# Patient Record
Sex: MALE | Race: WHITE | NOT HISPANIC OR LATINO | Employment: OTHER | URBAN - METROPOLITAN AREA
[De-identification: names, ages, dates, MRNs, and addresses within clinical notes are randomized per-mention and may not be internally consistent; named-entity substitution may affect disease eponyms.]

---

## 2023-03-20 ENCOUNTER — TELEPHONE (OUTPATIENT)
Dept: PRIMARY CARE | Facility: CLINIC | Age: 78
End: 2023-03-20
Payer: MEDICARE

## 2023-03-20 DIAGNOSIS — M25.50 ARTHRALGIA, UNSPECIFIED JOINT: ICD-10-CM

## 2023-03-20 DIAGNOSIS — M06.9 RHEUMATOID ARTHRITIS, INVOLVING UNSPECIFIED SITE, UNSPECIFIED WHETHER RHEUMATOID FACTOR PRESENT (MULTI): ICD-10-CM

## 2023-03-21 PROBLEM — M25.50 JOINT PAIN: Status: ACTIVE | Noted: 2023-03-21

## 2023-03-21 PROBLEM — M06.9 RHEUMATOID ARTHRITIS (MULTI): Status: ACTIVE | Noted: 2023-03-21

## 2023-04-21 ENCOUNTER — TELEPHONE (OUTPATIENT)
Dept: PRIMARY CARE | Facility: CLINIC | Age: 78
End: 2023-04-21

## 2023-08-01 ENCOUNTER — LAB (OUTPATIENT)
Dept: LAB | Facility: LAB | Age: 78
End: 2023-08-01
Payer: MEDICARE

## 2023-08-01 ENCOUNTER — OFFICE VISIT (OUTPATIENT)
Dept: PRIMARY CARE | Facility: CLINIC | Age: 78
End: 2023-08-01
Payer: MEDICARE

## 2023-08-01 VITALS
HEART RATE: 52 BPM | TEMPERATURE: 97.1 F | SYSTOLIC BLOOD PRESSURE: 128 MMHG | OXYGEN SATURATION: 99 % | WEIGHT: 179 LBS | HEIGHT: 68 IN | BODY MASS INDEX: 27.13 KG/M2 | DIASTOLIC BLOOD PRESSURE: 62 MMHG

## 2023-08-01 DIAGNOSIS — M06.9 RHEUMATOID ARTHRITIS, INVOLVING UNSPECIFIED SITE, UNSPECIFIED WHETHER RHEUMATOID FACTOR PRESENT (MULTI): ICD-10-CM

## 2023-08-01 DIAGNOSIS — E78.5 HYPERLIPIDEMIA, UNSPECIFIED HYPERLIPIDEMIA TYPE: ICD-10-CM

## 2023-08-01 DIAGNOSIS — K21.9 GASTROESOPHAGEAL REFLUX DISEASE WITHOUT ESOPHAGITIS: ICD-10-CM

## 2023-08-01 DIAGNOSIS — C20 RECTAL CANCER (MULTI): ICD-10-CM

## 2023-08-01 DIAGNOSIS — Z00.00 WELLNESS EXAMINATION: Primary | ICD-10-CM

## 2023-08-01 DIAGNOSIS — D64.9 ANEMIA, UNSPECIFIED TYPE: ICD-10-CM

## 2023-08-01 DIAGNOSIS — N40.0 BENIGN PROSTATIC HYPERPLASIA WITHOUT LOWER URINARY TRACT SYMPTOMS: ICD-10-CM

## 2023-08-01 DIAGNOSIS — M10.9 GOUT, UNSPECIFIED CAUSE, UNSPECIFIED CHRONICITY, UNSPECIFIED SITE: ICD-10-CM

## 2023-08-01 DIAGNOSIS — R97.20 ELEVATED PROSTATE SPECIFIC ANTIGEN LESS THAN 10 NG/ML: ICD-10-CM

## 2023-08-01 LAB
CARCINOEMBRYONIC AG (NG/ML) IN SER/PLAS: 1.1 UG/L
ERYTHROCYTE DISTRIBUTION WIDTH (RATIO) BY AUTOMATED COUNT: 13.9 % (ref 11.5–14.5)
ERYTHROCYTE MEAN CORPUSCULAR HEMOGLOBIN CONCENTRATION (G/DL) BY AUTOMATED: 30.7 G/DL (ref 32–36)
ERYTHROCYTE MEAN CORPUSCULAR VOLUME (FL) BY AUTOMATED COUNT: 92 FL (ref 80–100)
ERYTHROCYTES (10*6/UL) IN BLOOD BY AUTOMATED COUNT: 5.1 X10E12/L (ref 4.5–5.9)
FERRITIN (UG/LL) IN SER/PLAS: 69 UG/L (ref 20–300)
HEMATOCRIT (%) IN BLOOD BY AUTOMATED COUNT: 46.9 % (ref 41–52)
HEMOGLOBIN (G/DL) IN BLOOD: 14.4 G/DL (ref 13.5–17.5)
IRON (UG/DL) IN SER/PLAS: 81 UG/DL (ref 35–150)
IRON BINDING CAPACITY (UG/DL) IN SER/PLAS: 362 UG/DL (ref 240–445)
IRON SATURATION (%) IN SER/PLAS: 22 % (ref 25–45)
LEUKOCYTES (10*3/UL) IN BLOOD BY AUTOMATED COUNT: 6.8 X10E9/L (ref 4.4–11.3)
NRBC (PER 100 WBCS) BY AUTOMATED COUNT: 0 /100 WBC (ref 0–0)
PLATELETS (10*3/UL) IN BLOOD AUTOMATED COUNT: 175 X10E9/L (ref 150–450)
PROSTATE SPECIFIC ANTIGEN,SCREEN: 1.67 NG/ML (ref 0–4)
URATE (MG/DL) IN SER/PLAS: 6.9 MG/DL (ref 4–7.5)

## 2023-08-01 PROCEDURE — 1160F RVW MEDS BY RX/DR IN RCRD: CPT | Performed by: INTERNAL MEDICINE

## 2023-08-01 PROCEDURE — 82728 ASSAY OF FERRITIN: CPT

## 2023-08-01 PROCEDURE — 99214 OFFICE O/P EST MOD 30 MIN: CPT | Performed by: INTERNAL MEDICINE

## 2023-08-01 PROCEDURE — 83550 IRON BINDING TEST: CPT

## 2023-08-01 PROCEDURE — 36415 COLL VENOUS BLD VENIPUNCTURE: CPT

## 2023-08-01 PROCEDURE — 83540 ASSAY OF IRON: CPT

## 2023-08-01 PROCEDURE — 84550 ASSAY OF BLOOD/URIC ACID: CPT

## 2023-08-01 PROCEDURE — 1036F TOBACCO NON-USER: CPT | Performed by: INTERNAL MEDICINE

## 2023-08-01 PROCEDURE — 85027 COMPLETE CBC AUTOMATED: CPT

## 2023-08-01 PROCEDURE — 82378 CARCINOEMBRYONIC ANTIGEN: CPT

## 2023-08-01 PROCEDURE — G0439 PPPS, SUBSEQ VISIT: HCPCS | Performed by: INTERNAL MEDICINE

## 2023-08-01 PROCEDURE — 1159F MED LIST DOCD IN RCRD: CPT | Performed by: INTERNAL MEDICINE

## 2023-08-01 PROCEDURE — 84153 ASSAY OF PSA TOTAL: CPT

## 2023-08-01 RX ORDER — CHOLECALCIFEROL (VITAMIN D3) 25 MCG
1 TABLET ORAL DAILY
COMMUNITY
Start: 2015-06-15

## 2023-08-01 RX ORDER — OMEPRAZOLE 40 MG/1
1 CAPSULE, DELAYED RELEASE ORAL DAILY
COMMUNITY
Start: 2022-08-08 | End: 2023-12-08 | Stop reason: SDUPTHER

## 2023-08-01 RX ORDER — SIMVASTATIN 40 MG/1
1 TABLET, FILM COATED ORAL NIGHTLY
COMMUNITY
Start: 2014-10-02 | End: 2023-12-08 | Stop reason: SDUPTHER

## 2023-08-01 RX ORDER — FINASTERIDE 5 MG/1
TABLET, FILM COATED ORAL
COMMUNITY
Start: 2014-01-24 | End: 2023-12-08 | Stop reason: SDUPTHER

## 2023-08-01 RX ORDER — CELECOXIB 200 MG/1
200 CAPSULE ORAL 2 TIMES DAILY
COMMUNITY
Start: 2023-06-20 | End: 2024-01-16 | Stop reason: SDUPTHER

## 2023-08-01 NOTE — PROGRESS NOTES
Subjective   Patient ID: Ruy Ryan is a 77 y.o. male who presents for the following    MEDICARE WELLNESS 8/1/23 AND THE FOLLOWING     Assessment/Plan    Mild Anemia:  stable,      Asymptomatic Murmur: echo done last year. No major changes seen.      RA: he has elevated esr crp markers and + anti-dsDNA. RA is negative. he has localized pain as the base of the left thumb. failed hydroxychloroquine. He is following with Dr Pillai, rheumatology.      rectal cancer hx originally diagnosed in 2015: stable, follow up with GI every 5 years , check CEA      bph: stable, continue psa screen with dr solomon. patient taking finasteride.      gout flairs happen once a year. will continue to monitor him for now. he prefers no chronic treatment with allopurinol at this time.      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 He saw a rheumatologist and 2 orthopedic surgeons since last visit. He is currently going through hand therapy (doing essentially effleurage) and he states that his symptoms are improving significantly with therapy. He has no major complaints at this time.      Previous Hx:      RHEUMATOID ARTHRITIS: he now tells me that he had RA in the past and saw dr thompson . he does have ESR and CRP elevations in february 2022. he has a history of bleeding last november 2021 and he has been taking omeprazole ever since. he did not respond to Plaquenil at this time as his left fingers are swollen . he has not been taking Plaquenil for months.  Patient is tolerating Celebrex      rectal cancer: Oncology has signed off and he no longer sees Dr Carney. Last colonoscopy was last year.      bph: patient following with dr solomon. patient taking  "finasteride.      HLD: Patient denies any muscle aches and is tolerating statin therapy     GERD (Hx of GIB) : Patient denies any acid reflux symptoms including epigastric burning, nausea or vomiting. Denies any dysphagia.     family history: dad passed brain aneurysm, mom passed s/p heart surgery     colonoscopy is dr li 11/18/2021 with 5 year follow u    Visit Vitals  /62 (BP Location: Right arm, Patient Position: Sitting)   Pulse 52   Temp 36.2 °C (97.1 °F)   Ht 1.727 m (5' 8\")   Wt 81.2 kg (179 lb)   SpO2 99%   BMI 27.22 kg/m²   Smoking Status Never   BSA 1.97 m²     PHYSICAL EXAM     General appearance: Alert and in no acute distress. speech is clear and coherent  HEENT: Sclera and conjunctiva white, EOMI,     Respiratory : No respiratory distress, normal respiratory rhythm and effort. Clear bilateral breath sounds. No wheezing or rhonchi.   Cardiovascular: heart rate regular, S1, S2. no murmurs. no Lower extremity edema  Skin inspection: Normal skin color and pigmentation, normal skin turgor and no visible rash, induration, or cellulitis  MSK: 5/5 strength upper and lower extremities without gait abnormalities. no loss of muscle mass   Neuro: 2-12 CN grossly intact.  no slurred speech. no lateralizing deficit  Psychiatric Orientation: Oriented to person, place, and time. no depression, homicidal or suicidal thoughts, normal affect     REVIEW OF SYSTEMS   Constitutional: not feeling tired and no fever, chills or sweats. Denies weight loss    HEENT: no earache and no sore throat.   no headache  Cardiovascular: no exertional chest pain, no palpitations,    Lungs: Denies shortness of breath, exertional dyspnea   Gastrointestinal: no change in bowel habits, no diarrhea, no nausea, no vomiting and no abdominal pain. Denies Melena, brbpr or dark stool  Musculoskeletal: no myalgias, no muscle weakness    Skin: no rashes, no change in skin color   Neurological: no headaches, no seizures, no numbness, no " lateralizing deficits and no fainting.   Psychiatric: no depression and no anxiety.   Urine: denies polyuria, hematuria, dysuria      No Known Allergies    Current Outpatient Medications   Medication Sig Dispense Refill    celecoxib (CeleBREX) 200 mg capsule Take 1 capsule (200 mg) by mouth 2 times a day.      cholecalciferol (Vitamin D-3) 25 MCG (1000 UT) tablet Take 1 tablet (25 mcg) by mouth once daily.      finasteride (Proscar) 5 mg tablet Take by mouth.      omeprazole (PriLOSEC) 40 mg DR capsule Take 1 capsule (40 mg) by mouth once daily.      simvastatin (Zocor) 40 mg tablet Take 1 tablet (40 mg) by mouth once daily at bedtime.       No current facility-administered medications for this visit.       Objective     No visits with results within 4 Month(s) from this visit.   Latest known visit with results is:   Legacy Encounter on 02/01/2023   Component Date Value Ref Range Status    Ferritin 02/01/2023 53  20 - 300 ug/L Final    Cholesterol 02/01/2023 177  0 - 199 mg/dL Final    HDL 02/01/2023 53.0  mg/dL Final    Cholesterol/HDL Ratio 02/01/2023 3.3   Final    LDL 02/01/2023 95  0 - 99 mg/dL Final    VLDL 02/01/2023 29  0 - 40 mg/dL Final    Triglycerides 02/01/2023 147  0 - 149 mg/dL Final    WBC 02/01/2023 6.7  4.4 - 11.3 x10E9/L Final    nRBC 02/01/2023 0.0  0.0 - 0.0 /100 WBC Final    RBC 02/01/2023 5.09  4.50 - 5.90 x10E12/L Final    Hemoglobin 02/01/2023 14.1  13.5 - 17.5 g/dL Final    Hematocrit 02/01/2023 44.5  41.0 - 52.0 % Final    MCV 02/01/2023 87  80 - 100 fL Final    MCHC 02/01/2023 31.7 (L)  32.0 - 36.0 g/dL Final    Platelets 02/01/2023 179  150 - 450 x10E9/L Final    RDW 02/01/2023 14.1  11.5 - 14.5 % Final    Iron 02/01/2023 62  35 - 150 ug/dL Final    TIBC 02/01/2023 403  240 - 445 ug/dL Final    Iron Saturation 02/01/2023 15 (L)  25 - 45 % Final    Vitamin D, 25-Hydroxy 02/01/2023 49  ng/mL Final    Glucose 02/01/2023 89  74 - 99 mg/dL Final    Sodium 02/01/2023 140  136 - 145 mmol/L  Final    Potassium 02/01/2023 4.4  3.5 - 5.3 mmol/L Final    Chloride 02/01/2023 105  98 - 107 mmol/L Final    Bicarbonate 02/01/2023 26  21 - 32 mmol/L Final    Anion Gap 02/01/2023 13  10 - 20 mmol/L Final    Urea Nitrogen 02/01/2023 18  6 - 23 mg/dL Final    Creatinine 02/01/2023 1.11  0.50 - 1.30 mg/dL Final    GFR MALE 02/01/2023 67  >90 mL/min/1.73m2 Final    Calcium 02/01/2023 9.7  8.6 - 10.6 mg/dL Final    Albumin 02/01/2023 4.2  3.4 - 5.0 g/dL Final    Alkaline Phosphatase 02/01/2023 87  33 - 136 U/L Final    Total Protein 02/01/2023 7.2  6.4 - 8.2 g/dL Final    AST 02/01/2023 22  9 - 39 U/L Final    Total Bilirubin 02/01/2023 0.5  0.0 - 1.2 mg/dL Final    ALT (SGPT) 02/01/2023 14  10 - 52 U/L Final    TSH 02/01/2023 1.81  0.44 - 3.98 mIU/L Final       Radiology: Reviewed imaging in powerchart.  No results found.    No family history on file.  Social History     Socioeconomic History    Marital status:      Spouse name: None    Number of children: None    Years of education: None    Highest education level: None   Occupational History    None   Tobacco Use    Smoking status: Never    Smokeless tobacco: Never   Substance and Sexual Activity    Alcohol use: Never    Drug use: Never    Sexual activity: None   Other Topics Concern    None   Social History Narrative    None     Social Determinants of Health     Financial Resource Strain: Not on file   Food Insecurity: Not on file   Transportation Needs: Not on file   Physical Activity: Not on file   Stress: Not on file   Social Connections: Not on file   Intimate Partner Violence: Not on file   Housing Stability: Not on file     Past Medical History:   Diagnosis Date    Other specified diseases of anus and rectum 04/04/2016    Rectal mass     Past Surgical History:   Procedure Laterality Date    OTHER SURGICAL HISTORY  03/16/2015    Lipectomy    OTHER SURGICAL HISTORY  10/16/2015    Laparo Partial Colectomy W/ Anastomosis And Coloproctostomy    SHOULDER  SURGERY  03/16/2015    Shoulder Surgery       Charting was completed using voice recognition technology and may include unintended errors.

## 2023-12-08 DIAGNOSIS — E78.5 HYPERLIPIDEMIA, UNSPECIFIED HYPERLIPIDEMIA TYPE: Primary | ICD-10-CM

## 2023-12-08 DIAGNOSIS — N40.0 BENIGN PROSTATIC HYPERPLASIA WITHOUT LOWER URINARY TRACT SYMPTOMS: ICD-10-CM

## 2023-12-08 DIAGNOSIS — K21.9 GASTROESOPHAGEAL REFLUX DISEASE WITHOUT ESOPHAGITIS: ICD-10-CM

## 2023-12-08 RX ORDER — OMEPRAZOLE 40 MG/1
40 CAPSULE, DELAYED RELEASE ORAL DAILY
Qty: 90 CAPSULE | Refills: 3 | Status: SHIPPED | OUTPATIENT
Start: 2023-12-08

## 2023-12-08 RX ORDER — FINASTERIDE 5 MG/1
5 TABLET, FILM COATED ORAL DAILY
Qty: 90 TABLET | Refills: 3 | Status: SHIPPED | OUTPATIENT
Start: 2023-12-08

## 2023-12-08 RX ORDER — SIMVASTATIN 40 MG/1
40 TABLET, FILM COATED ORAL NIGHTLY
Qty: 90 TABLET | Refills: 3 | Status: SHIPPED | OUTPATIENT
Start: 2023-12-08

## 2024-01-01 PROCEDURE — 99291 CRITICAL CARE FIRST HOUR: CPT | Performed by: EMERGENCY MEDICINE

## 2024-01-16 DIAGNOSIS — Z00.00 ROUTINE GENERAL MEDICAL EXAMINATION AT A HEALTH CARE FACILITY: ICD-10-CM

## 2024-01-16 RX ORDER — CELECOXIB 200 MG/1
200 CAPSULE ORAL 2 TIMES DAILY
Qty: 180 CAPSULE | Refills: 3 | Status: SHIPPED | OUTPATIENT
Start: 2024-01-16

## 2024-01-29 ENCOUNTER — LAB (OUTPATIENT)
Dept: LAB | Facility: LAB | Age: 79
End: 2024-01-29
Payer: MEDICARE

## 2024-01-29 ENCOUNTER — OFFICE VISIT (OUTPATIENT)
Dept: PRIMARY CARE | Facility: CLINIC | Age: 79
End: 2024-01-29
Payer: MEDICARE

## 2024-01-29 VITALS — SYSTOLIC BLOOD PRESSURE: 138 MMHG | DIASTOLIC BLOOD PRESSURE: 78 MMHG

## 2024-01-29 DIAGNOSIS — E78.5 HYPERLIPIDEMIA, UNSPECIFIED HYPERLIPIDEMIA TYPE: ICD-10-CM

## 2024-01-29 DIAGNOSIS — M06.9 RHEUMATOID ARTHRITIS, INVOLVING UNSPECIFIED SITE, UNSPECIFIED WHETHER RHEUMATOID FACTOR PRESENT (MULTI): ICD-10-CM

## 2024-01-29 DIAGNOSIS — C20 RECTAL CANCER (MULTI): ICD-10-CM

## 2024-01-29 DIAGNOSIS — M10.9 GOUT, UNSPECIFIED CAUSE, UNSPECIFIED CHRONICITY, UNSPECIFIED SITE: ICD-10-CM

## 2024-01-29 DIAGNOSIS — D64.9 ANEMIA, UNSPECIFIED TYPE: ICD-10-CM

## 2024-01-29 DIAGNOSIS — E78.5 HYPERLIPIDEMIA, UNSPECIFIED HYPERLIPIDEMIA TYPE: Primary | ICD-10-CM

## 2024-01-29 LAB
ALBUMIN SERPL BCP-MCNC: 4.1 G/DL (ref 3.4–5)
ALP SERPL-CCNC: 87 U/L (ref 33–136)
ALT SERPL W P-5'-P-CCNC: 10 U/L (ref 10–52)
ANION GAP SERPL CALC-SCNC: 14 MMOL/L (ref 10–20)
AST SERPL W P-5'-P-CCNC: 16 U/L (ref 9–39)
BILIRUB SERPL-MCNC: 0.6 MG/DL (ref 0–1.2)
BUN SERPL-MCNC: 19 MG/DL (ref 6–23)
CALCIUM SERPL-MCNC: 10 MG/DL (ref 8.6–10.6)
CEA SERPL-MCNC: 0.9 UG/L
CHLORIDE SERPL-SCNC: 103 MMOL/L (ref 98–107)
CHOLEST SERPL-MCNC: 141 MG/DL (ref 0–199)
CHOLESTEROL/HDL RATIO: 2.9
CO2 SERPL-SCNC: 30 MMOL/L (ref 21–32)
CREAT SERPL-MCNC: 1.1 MG/DL (ref 0.5–1.3)
EGFRCR SERPLBLD CKD-EPI 2021: 68 ML/MIN/1.73M*2
ERYTHROCYTE [DISTWIDTH] IN BLOOD BY AUTOMATED COUNT: 14.8 % (ref 11.5–14.5)
FERRITIN SERPL-MCNC: 161 NG/ML (ref 20–300)
GLUCOSE SERPL-MCNC: 95 MG/DL (ref 74–99)
HCT VFR BLD AUTO: 41.6 % (ref 41–52)
HDLC SERPL-MCNC: 48.6 MG/DL
HGB BLD-MCNC: 12.6 G/DL (ref 13.5–17.5)
IRON SATN MFR SERPL: 9 % (ref 25–45)
IRON SERPL-MCNC: 30 UG/DL (ref 35–150)
LDLC SERPL CALC-MCNC: 69 MG/DL
MCH RBC QN AUTO: 26.3 PG (ref 26–34)
MCHC RBC AUTO-ENTMCNC: 30.3 G/DL (ref 32–36)
MCV RBC AUTO: 87 FL (ref 80–100)
NON HDL CHOLESTEROL: 92 MG/DL (ref 0–149)
NRBC BLD-RTO: 0 /100 WBCS (ref 0–0)
PLATELET # BLD AUTO: 221 X10*3/UL (ref 150–450)
POTASSIUM SERPL-SCNC: 4.7 MMOL/L (ref 3.5–5.3)
PROT SERPL-MCNC: 7.3 G/DL (ref 6.4–8.2)
RBC # BLD AUTO: 4.79 X10*6/UL (ref 4.5–5.9)
SODIUM SERPL-SCNC: 142 MMOL/L (ref 136–145)
TIBC SERPL-MCNC: 327 UG/DL (ref 240–445)
TRIGL SERPL-MCNC: 116 MG/DL (ref 0–149)
TSH SERPL-ACNC: 2.08 MIU/L (ref 0.44–3.98)
UIBC SERPL-MCNC: 297 UG/DL (ref 110–370)
URATE SERPL-MCNC: 7.1 MG/DL (ref 4–7.5)
VLDL: 23 MG/DL (ref 0–40)
WBC # BLD AUTO: 8.4 X10*3/UL (ref 4.4–11.3)

## 2024-01-29 PROCEDURE — 83550 IRON BINDING TEST: CPT

## 2024-01-29 PROCEDURE — 80061 LIPID PANEL: CPT

## 2024-01-29 PROCEDURE — 36415 COLL VENOUS BLD VENIPUNCTURE: CPT

## 2024-01-29 PROCEDURE — 84550 ASSAY OF BLOOD/URIC ACID: CPT

## 2024-01-29 PROCEDURE — 83540 ASSAY OF IRON: CPT

## 2024-01-29 PROCEDURE — 1036F TOBACCO NON-USER: CPT | Performed by: INTERNAL MEDICINE

## 2024-01-29 PROCEDURE — 1159F MED LIST DOCD IN RCRD: CPT | Performed by: INTERNAL MEDICINE

## 2024-01-29 PROCEDURE — 82728 ASSAY OF FERRITIN: CPT

## 2024-01-29 PROCEDURE — 82378 CARCINOEMBRYONIC ANTIGEN: CPT

## 2024-01-29 PROCEDURE — 85027 COMPLETE CBC AUTOMATED: CPT

## 2024-01-29 PROCEDURE — 80053 COMPREHEN METABOLIC PANEL: CPT

## 2024-01-29 PROCEDURE — 84443 ASSAY THYROID STIM HORMONE: CPT

## 2024-01-29 PROCEDURE — 99214 OFFICE O/P EST MOD 30 MIN: CPT | Performed by: INTERNAL MEDICINE

## 2024-01-29 NOTE — PROGRESS NOTES
Subjective   Patient ID: Ruy Ryan is a 78 y.o. male who presents for the following  CHRONIC DISEASE FOLLOW UP  MEDICARE WELLNESS 8/1/23    Assessment/Plan   Atypical chest pain: stress echo    GERD: stable on omeprazole.      Mild Anemia:  stable,      Asymptomatic Murmur: echo done last year. No major changes seen.      RA: he has elevated esr crp markers and + anti-dsDNA. RA is negative. he has localized pain as the base of the left thumb. failed hydroxychloroquine. He is following with Dr Pillai, rheumatology.      rectal cancer hx originally diagnosed in 2015: stable, follow up with GI every 5 years , check CEA      bph: stable, continue psa screen with dr solomon. patient taking finasteride.      gout flairs happen once a year. will continue to monitor him for now. he prefers no chronic treatment with allopurinol at this time.      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 He saw a rheumatologist and 2 orthopedic surgeons since last visit. He is currently going through hand therapy (doing essentially effleurage) and he states that his symptoms are improving significantly with therapy. He has no major complaints at this time.      Chest pain: patient says that he gets on and off chest pain. He says that it occurred with shoveling snow a few weeks ago. Central chest pain that is none radiating.      RHEUMATOID ARTHRITIS: he now tells me that he had RA in the past and saw dr thompson . he does have ESR and CRP elevations in february 2022. he has a history of bleeding last november 2021 and he has been taking omeprazole ever since. he did not respond to Plaquenil at this time as his left fingers are swollen . he has not been  taking Plaquenil for months.  Patient is tolerating Celebrex      rectal cancer: Oncology has signed off and he no longer sees Dr Carney. Last colonoscopy was last year.      bph: patient following with dr solomon. patient taking finasteride.      HLD: Patient denies any muscle aches and is tolerating statin therapy     GERD (Hx of GIB) : Patient denies any acid reflux symptoms including epigastric burning, nausea or vomiting. Denies any dysphagia.     family history: dad passed brain aneurysm, mom passed s/p heart surgery     colonoscopy is dr li 11/18/2021 with 5 year follow u    Visit Vitals  /78 (BP Location: Right arm, Patient Position: Sitting)   Smoking Status Never     PHYSICAL EXAM     General appearance: Alert and in no acute distress. speech is clear and coherent  HEENT: Sclera and conjunctiva white, EOMI,     Respiratory : No respiratory distress, normal respiratory rhythm and effort. Clear bilateral breath sounds. No wheezing or rhonchi.   Cardiovascular: heart rate regular, S1, S2. no murmurs. no Lower extremity edema  Skin inspection: Normal skin color and pigmentation, normal skin turgor and no visible rash, induration, or cellulitis  MSK: 5/5 strength upper and lower extremities without gait abnormalities. no loss of muscle mass   Neuro: 2-12 CN grossly intact.  no slurred speech. no lateralizing deficit  Psychiatric Orientation: Oriented to person, place, and time. no depression, homicidal or suicidal thoughts, normal affect     REVIEW OF SYSTEMS   Constitutional: not feeling tired and no fever, chills or sweats. Denies weight loss    HEENT: no earache and no sore throat.   no headache  Cardiovascular: no exertional chest pain, no palpitations,    Lungs: Denies shortness of breath, exertional dyspnea   Gastrointestinal: no change in bowel habits, no diarrhea, no nausea, no vomiting and no abdominal pain. Denies Melena, brbpr or dark stool  Musculoskeletal: no myalgias, no muscle weakness     Skin: no rashes, no change in skin color   Neurological: no headaches, no seizures, no numbness, no lateralizing deficits and no fainting.   Psychiatric: no depression and no anxiety.   Urine: denies polyuria, hematuria, dysuria      No Known Allergies    Current Outpatient Medications   Medication Sig Dispense Refill   • celecoxib (CeleBREX) 200 mg capsule Take 1 capsule (200 mg) by mouth 2 times a day. 180 capsule 3   • cholecalciferol (Vitamin D-3) 25 MCG (1000 UT) tablet Take 1 tablet (25 mcg) by mouth once daily.     • finasteride (Proscar) 5 mg tablet Take 1 tablet (5 mg) by mouth once daily. 90 tablet 3   • omeprazole (PriLOSEC) 40 mg DR capsule Take 1 capsule (40 mg) by mouth once daily. 90 capsule 3   • simvastatin (Zocor) 40 mg tablet Take 1 tablet (40 mg) by mouth once daily at bedtime. 90 tablet 3     No current facility-administered medications for this visit.       Objective     No visits with results within 4 Month(s) from this visit.   Latest known visit with results is:   Lab on 08/01/2023   Component Date Value Ref Range Status   • CARCINOEMBRYONIC AG (NG/ML) IN SER* 08/01/2023 1.1  ug/L Final   • WBC 08/01/2023 6.8  4.4 - 11.3 x10E9/L Final   • nRBC 08/01/2023 0.0  0.0 - 0.0 /100 WBC Final   • RBC 08/01/2023 5.10  4.50 - 5.90 x10E12/L Final   • Hemoglobin 08/01/2023 14.4  13.5 - 17.5 g/dL Final   • Hematocrit 08/01/2023 46.9  41.0 - 52.0 % Final   • MCV 08/01/2023 92  80 - 100 fL Final   • MCHC 08/01/2023 30.7 (L)  32.0 - 36.0 g/dL Final   • Platelets 08/01/2023 175  150 - 450 x10E9/L Final   • RDW 08/01/2023 13.9  11.5 - 14.5 % Final   • Uric Acid 08/01/2023 6.9  4.0 - 7.5 mg/dL Final   • Ferritin 08/01/2023 69  20 - 300 ug/L Final   • Iron 08/01/2023 81  35 - 150 ug/dL Final   • TIBC 08/01/2023 362  240 - 445 ug/dL Final   • Iron Saturation 08/01/2023 22 (L)  25 - 45 % Final   • Prostate Specific Antigen,Screen 08/01/2023 1.67  0.00 - 4.00 ng/mL Final       Radiology: Reviewed imaging in  powerchart.  No results found.    No family history on file.  Social History     Socioeconomic History   • Marital status:      Spouse name: None   • Number of children: None   • Years of education: None   • Highest education level: None   Occupational History   • None   Tobacco Use   • Smoking status: Never   • Smokeless tobacco: Never   Substance and Sexual Activity   • Alcohol use: Never   • Drug use: Never   • Sexual activity: None   Other Topics Concern   • None   Social History Narrative   • None     Social Determinants of Health     Financial Resource Strain: Not on file   Food Insecurity: Not on file   Transportation Needs: Not on file   Physical Activity: Not on file   Stress: Not on file   Social Connections: Not on file   Intimate Partner Violence: Not on file   Housing Stability: Not on file     Past Medical History:   Diagnosis Date   • Other specified diseases of anus and rectum 04/04/2016    Rectal mass     Past Surgical History:   Procedure Laterality Date   • OTHER SURGICAL HISTORY  03/16/2015    Lipectomy   • OTHER SURGICAL HISTORY  10/16/2015    Laparo Partial Colectomy W/ Anastomosis And Coloproctostomy   • SHOULDER SURGERY  03/16/2015    Shoulder Surgery       Charting was completed using voice recognition technology and may include unintended errors.

## 2024-02-06 ENCOUNTER — TELEPHONE (OUTPATIENT)
Dept: PRIMARY CARE | Facility: CLINIC | Age: 79
End: 2024-02-06

## 2024-02-06 NOTE — TELEPHONE ENCOUNTER
Patient was ordered a stress test by dr lundberg but its the treadmill one. He states he has a bad foot and is unable to do it. He was told to call the office and have dr order the nuclear stress test  Pleaser call patient once test is faxed so he can schedule

## 2024-03-04 NOTE — TELEPHONE ENCOUNTER
States no one ever called him regarding nuclear stress test. Pleas call pt and send order. Also would like to know details on this test.

## 2024-03-06 ENCOUNTER — TELEPHONE (OUTPATIENT)
Dept: PRIMARY CARE | Facility: CLINIC | Age: 79
End: 2024-03-06
Payer: MEDICARE

## 2024-03-06 NOTE — TELEPHONE ENCOUNTER
Pt called he does not want to do the stress test (which was already discussed), he also does not want to do the nuclear nelli scan.    Pt was wondering if he could get EKG done

## 2024-03-22 ENCOUNTER — TELEPHONE (OUTPATIENT)
Dept: PRIMARY CARE | Facility: CLINIC | Age: 79
End: 2024-03-22
Payer: MEDICARE

## 2024-03-22 NOTE — TELEPHONE ENCOUNTER
PT was discharged today from Hospital and told to have a follow up with Dr Salinas.  In one week.  Please provide a date and time so we can schedule.

## 2024-03-25 ENCOUNTER — APPOINTMENT (OUTPATIENT)
Dept: PRIMARY CARE | Facility: CLINIC | Age: 79
End: 2024-03-25
Payer: MEDICARE

## 2024-03-25 NOTE — TELEPHONE ENCOUNTER
TCM, discharged on 3/22/24 , patient did receive their discharge medications. Patient does not require immediate attention of the attending at this time and is scheduled for an appointment on 4/2/24.

## 2024-04-01 ENCOUNTER — LAB REQUISITION (OUTPATIENT)
Dept: LAB | Facility: HOSPITAL | Age: 79
End: 2024-04-01
Payer: MEDICARE

## 2024-04-02 ENCOUNTER — APPOINTMENT (OUTPATIENT)
Dept: PRIMARY CARE | Facility: CLINIC | Age: 79
End: 2024-04-02
Payer: MEDICARE

## 2024-04-02 ENCOUNTER — TELEPHONE (OUTPATIENT)
Dept: PRIMARY CARE | Facility: CLINIC | Age: 79
End: 2024-04-02

## 2024-04-02 ENCOUNTER — PATIENT OUTREACH (OUTPATIENT)
Dept: CARE COORDINATION | Facility: CLINIC | Age: 79
End: 2024-04-02

## 2024-04-02 LAB
LABORATORY COMMENT REPORT: NORMAL
PATH REPORT.GROSS SPEC: NORMAL
PATH REPORT.TOTAL CANCER: NORMAL

## 2024-04-02 NOTE — PROGRESS NOTES
"Outreach call to patient to support a smooth transition of care from recent admission.  Patient was discharged on Sunday .Spoke with wife. Explained to her that there is limited dishchare information in care everywhere. Advised that her  should get in with the PCP as soon as possible for a post hospital follow  up. Spouse has a a \"little cough \" and  she is asking if he will need to take iron supplements. Called PCP office and scheduled follow up with DR. Stoddard. Encouraged her if she has any further concerns /questions to call his office since there is limited dc information for this care manager to see in system. Spouse has what he needs at home and he has follow up with cardiologist scheduled also. Wife has phone number to PCP office.       Peg Malagon , RN   Nurse Care Manager   [facility] Accountable Care Department   [phone]   "

## 2024-04-22 ENCOUNTER — TELEPHONE (OUTPATIENT)
Dept: PRIMARY CARE | Facility: CLINIC | Age: 79
End: 2024-04-22
Payer: MEDICARE

## 2024-04-22 NOTE — TELEPHONE ENCOUNTER
TCM, discharged on 4/19/24 , patient did receive their discharge medications. Patient does not require immediate attention of the attending at this time and is scheduled for an appointment on 4/29/24.

## 2024-04-29 ENCOUNTER — OFFICE VISIT (OUTPATIENT)
Dept: PRIMARY CARE | Facility: CLINIC | Age: 79
End: 2024-04-29
Payer: MEDICARE

## 2024-04-29 VITALS
OXYGEN SATURATION: 95 % | HEART RATE: 84 BPM | WEIGHT: 160 LBS | BODY MASS INDEX: 24.25 KG/M2 | HEIGHT: 68 IN | DIASTOLIC BLOOD PRESSURE: 74 MMHG | SYSTOLIC BLOOD PRESSURE: 114 MMHG

## 2024-04-29 DIAGNOSIS — R55 SYNCOPE, UNSPECIFIED SYNCOPE TYPE: Primary | ICD-10-CM

## 2024-04-29 DIAGNOSIS — R19.7 DIARRHEA, UNSPECIFIED TYPE: ICD-10-CM

## 2024-04-29 PROCEDURE — 1036F TOBACCO NON-USER: CPT | Performed by: INTERNAL MEDICINE

## 2024-04-29 PROCEDURE — 99495 TRANSJ CARE MGMT MOD F2F 14D: CPT | Performed by: INTERNAL MEDICINE

## 2024-04-29 PROCEDURE — 1159F MED LIST DOCD IN RCRD: CPT | Performed by: INTERNAL MEDICINE

## 2024-04-29 RX ORDER — FERROUS SULFATE 325(65) MG
325 TABLET, DELAYED RELEASE (ENTERIC COATED) ORAL
COMMUNITY
Start: 2024-04-18

## 2024-04-29 RX ORDER — CLOPIDOGREL BISULFATE 75 MG/1
75 TABLET, FILM COATED ORAL DAILY
COMMUNITY
Start: 2024-04-18

## 2024-04-29 RX ORDER — AMIODARONE HYDROCHLORIDE 200 MG/1
200 TABLET ORAL DAILY
COMMUNITY
Start: 2024-04-18

## 2024-04-29 RX ORDER — COLCHICINE 0.6 MG/1
0.6 CAPSULE ORAL DAILY
COMMUNITY
Start: 2024-04-01

## 2024-04-29 NOTE — PROGRESS NOTES
"Subjective   Patient ID: Ruy Ryan is a 78 y.o. male who presents for the following  TRANSITIONS OF CARE  MEDICARE WELLNESS 8/1/23    Assessment/Plan   Syncope: vagally mediated likely volume depletion from diarrhea.     Hx VF arrest: patient may not be tolerating amiodarone and is now on a lower dose 200mg daily. He is also on metoprolol 12.5mg daily .   Will arrange patient to see cardiologist sooner.     Coughing up blood: \"flecks of blood and hoarseness\" CTA chest was negative April 19, 2024, but showed improvement of left pleural effusion from March 27,2024 s/p left thoracentesis shows exudate along with blood April 3/2024. He did have arrest during this time  . Will continue to monitor clinically. Patient is on plavix   Will follow up in 1 month. If mildred hemoptysis go to the ED.     Other medical issues,   GERD: stable on omeprazole.      Mild Anemia:  stable,      Asymptomatic Murmur: echo done last year. No major changes seen.      RA: he has elevated esr crp markers and + anti-dsDNA. RA is negative. he has localized pain as the base of the left thumb. failed hydroxychloroquine. He is following with Dr Pillai, rheumatology.      rectal cancer hx originally diagnosed in 2015: stable, follow up with GI every 5 years , check CEA      bph: stable, continue psa screen with dr solomon. patient taking finasteride.      gout flairs happen once a year. will continue to monitor him for now. he prefers no chronic treatment with allopurinol at this time.      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  Patient had a CT head which was " negative.  CT facial bones were also without abnormalities.  CT angiogram was within normal limits.  Patient was having issues with diarrhea and to be due to amiodarone and therefore this medication was stopped.  Patient seen by cardiology.  They then resume the amiodarone 200 mg along with a restart metoprolol 12.5 mg daily as well as a low-dose lisinopril 2.5 mg daily.  Patient is discharged at this time.    Patient is taking the above medications but stopped lisinopril as his blood pressure was running too low.     Patient says that he has coughed up flecks of blood intermittently. He says that     Other medical issues, see below   He still has R hand pain. He saw a rheumatologist and 2 orthopedic surgeons since last visit. He is currently going through hand therapy (doing essentially effleurage) and he states that his symptoms are improving significantly with therapy. He has no major complaints at this time.          RHEUMATOID ARTHRITIS: he now tells me that he had RA in the past and saw dr thompson . he does have ESR and CRP elevations in february 2022. he has a history of bleeding last november 2021 and he has been taking omeprazole ever since. he did not respond to Plaquenil at this time as his left fingers are swollen . he has not been taking Plaquenil for months.  Patient is tolerating Celebrex      rectal cancer: Oncology has signed off and he no longer sees Dr Carney. Last colonoscopy was last year.      bph: patient following with dr solomon. patient taking finasteride.      HLD: Patient denies any muscle aches and is tolerating statin therapy     GERD (Hx of GIB) : Patient denies any acid reflux symptoms including epigastric burning, nausea or vomiting. Denies any dysphagia.     family history: dad passed brain aneurysm, mom passed s/p heart surgery     colonoscopy is dr li 11/18/2021 with 5 year follow u    Visit Vitals  /74 (BP Location: Left arm, Patient Position: Sitting, BP Cuff Size:  "Adult)   Pulse 84   Ht 1.727 m (5' 8\")   Wt 72.6 kg (160 lb)   SpO2 95%   BMI 24.33 kg/m²   Smoking Status Never   BSA 1.87 m²     PHYSICAL EXAM     General appearance: Alert and in no acute distress. speech is clear and coherent  HEENT: Sclera and conjunctiva white, EOMI,     Respiratory : No respiratory distress, normal respiratory rhythm and effort. Clear bilateral breath sounds. No wheezing or rhonchi.   Cardiovascular: heart rate regular, S1, S2. no murmurs. no Lower extremity edema   MSK: 5/5 strength upper and lower extremities without gait abnormalities. no loss of muscle mass   Neuro: 2-12 CN grossly intact.  no slurred speech. no lateralizing deficit  Psychiatric Orientation: Oriented to person, place, and time. no depression, homicidal or suicidal thoughts, normal affect     REVIEW OF SYSTEMS   Constitutional: not feeling tired and no fever, chills or sweats. Denies weight loss    HEENT: no earache and no sore throat.   no headache  Cardiovascular: no exertional chest pain, no palpitations,    Lungs: Denies shortness of breath, exertional dyspnea   Gastrointestinal: no change in bowel habits, no diarrhea, no nausea, no vomiting and no abdominal pain. Denies Melena, brbpr or dark stool  Musculoskeletal: no myalgias, no muscle weakness     Neurological: no headaches, no seizures, no numbness, no lateralizing deficits and no fainting.   Psychiatric: no depression and no anxiety.      Allergies   Allergen Reactions    Neomycin Nausea/vomiting    Oxycodone-Acetaminophen Hallucinations     HALLUCINATIONS       Current Outpatient Medications   Medication Sig Dispense Refill    amiodarone (Pacerone) 200 mg tablet Take 1 tablet (200 mg) by mouth once daily.      cholecalciferol (Vitamin D-3) 25 MCG (1000 UT) tablet Take 1 tablet (25 mcg) by mouth once daily.      colchicine (Mitigare) 0.6 mg capsule capsule Take 1 capsule (0.6 mg) by mouth once daily.      ferrous sulfate 325 (65 Fe) MG EC tablet Take 1 tablet by " mouth 2 times a day with meals.      finasteride (Proscar) 5 mg tablet Take 1 tablet (5 mg) by mouth once daily. 90 tablet 3    omeprazole (PriLOSEC) 40 mg DR capsule Take 1 capsule (40 mg) by mouth once daily. 90 capsule 3    Plavix 75 mg tablet Take 1 tablet (75 mg) by mouth once daily.      simvastatin (Zocor) 40 mg tablet Take 1 tablet (40 mg) by mouth once daily at bedtime. 90 tablet 3    celecoxib (CeleBREX) 200 mg capsule Take 1 capsule (200 mg) by mouth 2 times a day. 180 capsule 3     No current facility-administered medications for this visit.       Objective     Lab Requisition on 03/29/2024   Component Date Value Ref Range Status    Case Report 03/29/2024    Final                    Value:Medical Cytology Report                           Case: [id number]                                  Authorizing Provider:  Adalberto Quigley, Collected:           03/29/2024 1200                                     MD                                                                           Ordering Location:     [facility]       Received:            04/01/2024 [facility]                                     Center                                                                       Specimen:    PLEURAL FLUID LEFT SIDE                                                                    Gross Description 03/29/2024    Final                    Value:This result contains rich text formatting which cannot be displayed here.    Specimen Processing Detail 03/29/2024    Final                    Value:This result contains rich text formatting which cannot be displayed here.   Lab on 01/29/2024   Component Date Value Ref Range Status    Carcinoembryonic AG 01/29/2024 0.9  ug/L Final    WBC 01/29/2024 8.4  4.4 - 11.3 x10*3/uL Final    nRBC 01/29/2024 0.0  0.0 - 0.0 /100 WBCs Final    RBC 01/29/2024 4.79  4.50 - 5.90 x10*6/uL Final    Hemoglobin 01/29/2024 12.6 (L)  13.5 - 17.5 g/dL Final    Hematocrit 01/29/2024 41.6   41.0 - 52.0 % Final    MCV 01/29/2024 87  80 - 100 fL Final    MCH 01/29/2024 26.3  26.0 - 34.0 pg Final    MCHC 01/29/2024 30.3 (L)  32.0 - 36.0 g/dL Final    RDW 01/29/2024 14.8 (H)  11.5 - 14.5 % Final    Platelets 01/29/2024 221  150 - 450 x10*3/uL Final    Glucose 01/29/2024 95  74 - 99 mg/dL Final    Sodium 01/29/2024 142  136 - 145 mmol/L Final    Potassium 01/29/2024 4.7  3.5 - 5.3 mmol/L Final    Chloride 01/29/2024 103  98 - 107 mmol/L Final    Bicarbonate 01/29/2024 30  21 - 32 mmol/L Final    Anion Gap 01/29/2024 14  10 - 20 mmol/L Final    Urea Nitrogen 01/29/2024 19  6 - 23 mg/dL Final    Creatinine 01/29/2024 1.10  0.50 - 1.30 mg/dL Final    eGFR 01/29/2024 68  >60 mL/min/1.73m*2 Final    Calcium 01/29/2024 10.0  8.6 - 10.6 mg/dL Final    Albumin 01/29/2024 4.1  3.4 - 5.0 g/dL Final    Alkaline Phosphatase 01/29/2024 87  33 - 136 U/L Final    Total Protein 01/29/2024 7.3  6.4 - 8.2 g/dL Final    AST 01/29/2024 16  9 - 39 U/L Final    Bilirubin, Total 01/29/2024 0.6  0.0 - 1.2 mg/dL Final    ALT 01/29/2024 10  10 - 52 U/L Final    Cholesterol 01/29/2024 141  0 - 199 mg/dL Final    HDL-Cholesterol 01/29/2024 48.6  mg/dL Final    Cholesterol/HDL Ratio 01/29/2024 2.9   Final    LDL Calculated 01/29/2024 69  <=99 mg/dL Final    VLDL 01/29/2024 23  0 - 40 mg/dL Final    Triglycerides 01/29/2024 116  0 - 149 mg/dL Final    Non HDL Cholesterol 01/29/2024 92  0 - 149 mg/dL Final    Thyroid Stimulating Hormone 01/29/2024 2.08  0.44 - 3.98 mIU/L Final    Ferritin 01/29/2024 161  20 - 300 ng/mL Final    Iron 01/29/2024 30 (L)  35 - 150 ug/dL Final    UIBC 01/29/2024 297  110 - 370 ug/dL Final    TIBC 01/29/2024 327  240 - 445 ug/dL Final    % Saturation 01/29/2024 9 (L)  25 - 45 % Final    Uric Acid 01/29/2024 7.1  4.0 - 7.5 mg/dL Final       Radiology: Reviewed imaging in powerchart.  No results found.    No family history on file.  Social History     Socioeconomic History    Marital status:      Spouse  name: None    Number of children: None    Years of education: None    Highest education level: None   Occupational History    None   Tobacco Use    Smoking status: Never    Smokeless tobacco: Never   Substance and Sexual Activity    Alcohol use: Never    Drug use: Never    Sexual activity: None   Other Topics Concern    None   Social History Narrative    None     Social Determinants of Health     Financial Resource Strain: Not on file   Food Insecurity: Not on file   Transportation Needs: Not on file   Physical Activity: Not on file   Stress: Not on file   Social Connections: Not on file   Intimate Partner Violence: Not on file   Housing Stability: Not on file     Past Medical History:   Diagnosis Date    Other specified diseases of anus and rectum 04/04/2016    Rectal mass     Past Surgical History:   Procedure Laterality Date    OTHER SURGICAL HISTORY  03/16/2015    Lipectomy    OTHER SURGICAL HISTORY  10/16/2015    Laparo Partial Colectomy W/ Anastomosis And Coloproctostomy    SHOULDER SURGERY  03/16/2015    Shoulder Surgery       Charting was completed using voice recognition technology and may include unintended errors.

## 2024-04-30 ENCOUNTER — DOCUMENTATION (OUTPATIENT)
Dept: CARE COORDINATION | Facility: CLINIC | Age: 79
End: 2024-04-30
Payer: MEDICARE

## 2024-04-30 NOTE — PROGRESS NOTES
Outreach call to patient to check in 30 days after hospital discharge to support smooth transition of care. Went over follow up appnt with PCP. Has follow up with cards on Thursday. Sent secure message to Dr. Salinas in regard to the allergy medication. Received message patient an take a 10 mg daily.     Peg Malagon , RN   Nurse Care Manager   [facility] Department   [phone]

## 2024-05-02 ENCOUNTER — TELEPHONE (OUTPATIENT)
Dept: PRIMARY CARE | Facility: CLINIC | Age: 79
End: 2024-05-02
Payer: MEDICARE

## 2024-05-02 NOTE — TELEPHONE ENCOUNTER
PATIENT HAD AN APT SCHEDULED WITH [facility] TODAY. HE WAS SCHEDULED WITH THEIR N.P BUT DOESN'T WANT HER BECAUSE HE HAS SEEN HER BEFORE AND DOESN'T CARE FOR HER.  HE CAN'T GET INTO SEE [facility] UNTIL AUGUST AND THE PATIENT WANTS DR VILLARREAL TO BE AWARE OF THIS AND IS ASKING IF THERE IS ANYWAY HE COULD TALK TO DR RENE SO HE COULD BE SEEN SOONER.    PATIENT IS ASKING FOR A CALL BACK

## 2024-05-03 NOTE — TELEPHONE ENCOUNTER
Informed pt Dr. Salinas spoke to Dr. Fajardo and their office will reach out to patient for follow up.

## 2024-06-04 ENCOUNTER — LAB (OUTPATIENT)
Dept: LAB | Facility: LAB | Age: 79
End: 2024-06-04
Payer: MEDICARE

## 2024-06-04 ENCOUNTER — OFFICE VISIT (OUTPATIENT)
Dept: PRIMARY CARE | Facility: CLINIC | Age: 79
End: 2024-06-04
Payer: MEDICARE

## 2024-06-04 VITALS
SYSTOLIC BLOOD PRESSURE: 117 MMHG | BODY MASS INDEX: 24.86 KG/M2 | HEART RATE: 63 BPM | DIASTOLIC BLOOD PRESSURE: 77 MMHG | HEIGHT: 68 IN | OXYGEN SATURATION: 95 % | WEIGHT: 164 LBS

## 2024-06-04 DIAGNOSIS — C20 RECTAL CANCER (MULTI): ICD-10-CM

## 2024-06-04 DIAGNOSIS — D64.9 ANEMIA, UNSPECIFIED TYPE: ICD-10-CM

## 2024-06-04 DIAGNOSIS — M10.9 GOUT, UNSPECIFIED CAUSE, UNSPECIFIED CHRONICITY, UNSPECIFIED SITE: ICD-10-CM

## 2024-06-04 DIAGNOSIS — I50.9 CHRONIC CONGESTIVE HEART FAILURE, UNSPECIFIED HEART FAILURE TYPE (MULTI): ICD-10-CM

## 2024-06-04 DIAGNOSIS — M10.9 GOUT, UNSPECIFIED CAUSE, UNSPECIFIED CHRONICITY, UNSPECIFIED SITE: Primary | ICD-10-CM

## 2024-06-04 LAB
ERYTHROCYTE [DISTWIDTH] IN BLOOD BY AUTOMATED COUNT: 16.2 % (ref 11.5–14.5)
FERRITIN SERPL-MCNC: 389 NG/ML (ref 20–300)
FOLATE SERPL-MCNC: 19.3 NG/ML
HCT VFR BLD AUTO: 41.4 % (ref 41–52)
HGB BLD-MCNC: 12.9 G/DL (ref 13.5–17.5)
IRON SATN MFR SERPL: 11 % (ref 25–45)
IRON SERPL-MCNC: 32 UG/DL (ref 35–150)
MCH RBC QN AUTO: 27.5 PG (ref 26–34)
MCHC RBC AUTO-ENTMCNC: 31.2 G/DL (ref 32–36)
MCV RBC AUTO: 88 FL (ref 80–100)
NRBC BLD-RTO: 0 /100 WBCS (ref 0–0)
PLATELET # BLD AUTO: 247 X10*3/UL (ref 150–450)
RBC # BLD AUTO: 4.69 X10*6/UL (ref 4.5–5.9)
TIBC SERPL-MCNC: 289 UG/DL (ref 240–445)
UIBC SERPL-MCNC: 257 UG/DL (ref 110–370)
VIT B12 SERPL-MCNC: 630 PG/ML (ref 211–911)
WBC # BLD AUTO: 8.5 X10*3/UL (ref 4.4–11.3)

## 2024-06-04 PROCEDURE — 82728 ASSAY OF FERRITIN: CPT

## 2024-06-04 PROCEDURE — 36415 COLL VENOUS BLD VENIPUNCTURE: CPT

## 2024-06-04 PROCEDURE — 82746 ASSAY OF FOLIC ACID SERUM: CPT

## 2024-06-04 PROCEDURE — 99214 OFFICE O/P EST MOD 30 MIN: CPT | Performed by: INTERNAL MEDICINE

## 2024-06-04 PROCEDURE — 82607 VITAMIN B-12: CPT

## 2024-06-04 PROCEDURE — 83550 IRON BINDING TEST: CPT

## 2024-06-04 PROCEDURE — 85027 COMPLETE CBC AUTOMATED: CPT

## 2024-06-04 PROCEDURE — 83540 ASSAY OF IRON: CPT

## 2024-06-04 PROCEDURE — G2211 COMPLEX E/M VISIT ADD ON: HCPCS | Performed by: INTERNAL MEDICINE

## 2024-06-04 PROCEDURE — 1036F TOBACCO NON-USER: CPT | Performed by: INTERNAL MEDICINE

## 2024-06-04 PROCEDURE — 1159F MED LIST DOCD IN RCRD: CPT | Performed by: INTERNAL MEDICINE

## 2024-06-04 RX ORDER — METOPROLOL TARTRATE 50 MG/1
25 TABLET ORAL DAILY
COMMUNITY
Start: 2024-03-22

## 2024-06-04 RX ORDER — ASPIRIN 81 MG/1
81 TABLET ORAL DAILY
COMMUNITY
Start: 2024-03-22

## 2024-06-04 NOTE — PROGRESS NOTES
"Subjective   Patient ID: Ruy Ryan is a 78 y.o. male who presents for the following  Chronic care management    MEDICARE WELLNESS 8/1/23    Assessment/Plan   Syncope: vagally mediated likely volume depletion from diarrhea.     CHF EF 25%. Hx VF arrest: patient may not be tolerating amiodarone and is now on a lower dose 200mg daily. He is also on metoprolol 12.5mg daily .   Will arrange patient to see cardiologist sooner.   Consider ICD, discuss with cardiology      Coughing up blood: \"flecks of blood and hoarseness\" CTA chest was negative April 19, 2024, but showed improvement of left pleural effusion from March 27,2024 s/p left thoracentesis shows exudate along with blood April 3/2024. He did have arrest during this time  . Will continue to monitor clinically. Patient is on plavix       GERD: stable on omeprazole.      Mild Anemia:  stable,      Asymptomatic Murmur: echo done last year. No major changes seen.      RA: he has elevated esr crp markers and + anti-dsDNA. RA is negative. he has localized pain as the base of the left thumb. failed hydroxychloroquine. He is following with Dr Pillai, rheumatology. Patient is not taking any medications at this time.      rectal cancer hx originally diagnosed in 2015: stable, follow up with GI every 5 years , check CEA      bph: stable, continue psa screen with dr solomon. patient taking finasteride.      gout flairs happen once a year. will continue to monitor him for now. he prefers no chronic treatment with allopurinol at this time.      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 patient denies any stroke like " symptoms      RHEUMATOID ARTHRITIS: he now tells me that he had RA in the past and saw dr thompson . he does have ESR and CRP elevations in february 2022. he has a history of bleeding last november 2021 and he has been taking omeprazole ever since. he did not respond to Plaquenil at this time as his left fingers are swollen . he has not been taking Plaquenil for months.  Patient is tolerating Celebrex      rectal cancer: Oncology has signed off and he no longer sees Dr Carney.      bph: patient following with dr solomon. patient taking finasteride.      HLD: Patient denies any muscle aches and is tolerating statin therapy     GERD (Hx of GIB) : Patient denies any acid reflux symptoms including epigastric burning, nausea or vomiting. Denies any dysphagia.     family history: dad passed brain aneurysm, mom passed s/p heart surgery       HOSPITALIZATIONS  @ [facility] from April 18 to April 19, 2024 for syncopal event while sitting on the toilet.  Patient had a CT head which was negative.  CT facial bones were also without abnormalities.  CT angiogram was within normal limits.  Patient was having issues with diarrhea and to be due to amiodarone and therefore this medication was stopped.  Patient seen by cardiology.  They then resume the amiodarone 200 mg along with a restart metoprolol 12.5 mg daily as well as a low-dose lisinopril 2.5 mg daily.  Patient is discharged at this time.  Visit Vitals  Smoking Status Never     PHYSICAL EXAM     General appearance: Alert and in no acute distress. speech is clear and coherent  HEENT: Sclera and conjunctiva white, EOMI,     Respiratory : No respiratory distress, normal respiratory rhythm and effort. Clear bilateral breath sounds. No wheezing or rhonchi.   Cardiovascular: heart rate regular, S1, S2. no murmurs. no Lower extremity edema   MSK: 5/5 strength upper and lower extremities without gait abnormalities. no loss of muscle mass   Neuro: 2-12 CN grossly intact.  no  slurred speech. no lateralizing deficit  Psychiatric Orientation: Oriented to person, place, and time. no depression, homicidal or suicidal thoughts, normal affect     REVIEW OF SYSTEMS   Constitutional: not feeling tired and no fever, chills or sweats. Denies weight loss    HEENT: no earache and no sore throat.   no headache  Cardiovascular: no exertional chest pain, no palpitations,    Lungs: Denies shortness of breath, exertional dyspnea   Gastrointestinal: no change in bowel habits, no diarrhea, no nausea, no vomiting and no abdominal pain. Denies Melena, brbpr or dark stool  Musculoskeletal: no myalgias, no muscle weakness     Neurological: no headaches, no seizures, no numbness, no lateralizing deficits and no fainting.   Psychiatric: no depression and no anxiety.      Allergies   Allergen Reactions    Neomycin Nausea/vomiting    Oxycodone-Acetaminophen Hallucinations     HALLUCINATIONS       Current Outpatient Medications   Medication Sig Dispense Refill    amiodarone (Pacerone) 200 mg tablet Take 1 tablet (200 mg) by mouth once daily.      celecoxib (CeleBREX) 200 mg capsule Take 1 capsule (200 mg) by mouth 2 times a day. 180 capsule 3    cholecalciferol (Vitamin D-3) 25 MCG (1000 UT) tablet Take 1 tablet (25 mcg) by mouth once daily.      colchicine (Mitigare) 0.6 mg capsule capsule Take 1 capsule (0.6 mg) by mouth once daily.      ferrous sulfate 325 (65 Fe) MG EC tablet Take 1 tablet by mouth 2 times a day with meals.      finasteride (Proscar) 5 mg tablet Take 1 tablet (5 mg) by mouth once daily. 90 tablet 3    omeprazole (PriLOSEC) 40 mg DR capsule Take 1 capsule (40 mg) by mouth once daily. 90 capsule 3    Plavix 75 mg tablet Take 1 tablet (75 mg) by mouth once daily.      simvastatin (Zocor) 40 mg tablet Take 1 tablet (40 mg) by mouth once daily at bedtime. 90 tablet 3     No current facility-administered medications for this visit.       Objective     Lab Requisition on 03/29/2024   Component Date  Value Ref Range Status    Case Report 03/29/2024    Final                    Value:Medical Cytology Report                           Case: [id number]                                  Authorizing Provider:  Adalberto Quigley, Collected:           03/29/2024 1200                                     MD                                                                           Ordering Location:     [facility]       Received:            04/01/2024 1915                                     Center                                                                       Specimen:    PLEURAL FLUID LEFT SIDE                                                                    Gross Description 03/29/2024    Final                    Value:This result contains rich text formatting which cannot be displayed here.    Specimen Processing Detail 03/29/2024    Final                    Value:This result contains rich text formatting which cannot be displayed here.       Radiology: Reviewed imaging in powerchart.  No results found.    No family history on file.  Social History     Socioeconomic History    Marital status:      Spouse name: Not on file    Number of children: Not on file    Years of education: Not on file    Highest education level: Not on file   Occupational History    Not on file   Tobacco Use    Smoking status: Never    Smokeless tobacco: Never   Substance and Sexual Activity    Alcohol use: Never    Drug use: Never    Sexual activity: Not on file   Other Topics Concern    Not on file   Social History Narrative    Not on file     Social Determinants of Health     Financial Resource Strain: Not on file   Food Insecurity: Not on file   Transportation Needs: Not on file   Physical Activity: Not on file   Stress: Not on file   Social Connections: Not on file   Intimate Partner Violence: Not on file   Housing Stability: Not on file     Past Medical History:   Diagnosis Date    Other specified diseases of  anus and rectum 04/04/2016    Rectal mass     Past Surgical History:   Procedure Laterality Date    OTHER SURGICAL HISTORY  03/16/2015    Lipectomy    OTHER SURGICAL HISTORY  10/16/2015    Laparo Partial Colectomy W/ Anastomosis And Coloproctostomy    SHOULDER SURGERY  03/16/2015    Shoulder Surgery       Charting was completed using voice recognition technology and may include unintended errors.

## 2024-06-04 NOTE — PROGRESS NOTES
"Subjective   Patient ID: Ruy Ryan is a 78 y.o. male who presents for the following  Chronic care management    MEDICARE WELLNESS 8/1/23    Assessment/Plan   htn  Syncope: vagally mediated likely volume depletion from diarrhea.     Hx VF arrest: patient may not be tolerating amiodarone and is now on a lower dose 200mg daily. He is also on metoprolol 12.5mg daily .   Will arrange patient to see cardiologist sooner.     Coughing up blood: \"flecks of blood and hoarseness\" CTA chest was negative April 19, 2024, but showed improvement of left pleural effusion from March 27,2024 s/p left thoracentesis shows exudate along with blood April 3/2024. He did have arrest during this time  . Will continue to monitor clinically. Patient is on plavix   Will follow up in 1 month. If mildred hemoptysis go to the ED.     Other medical issues,   GERD: stable on omeprazole.      Mild Anemia:  stable,      Asymptomatic Murmur: echo done last year. No major changes seen.      RA: he has elevated esr crp markers and + anti-dsDNA. RA is negative. he has localized pain as the base of the left thumb. failed hydroxychloroquine. He is following with Dr Pillai, rheumatology.      rectal cancer hx originally diagnosed in 2015: stable, follow up with GI every 5 years , check CEA      bph: stable, continue psa screen with dr solomon. patient taking finasteride.      gout flairs happen once a year. will continue to monitor him for now. he prefers no chronic treatment with allopurinol at this time.      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  Patient had a CT " head which was negative.  CT facial bones were also without abnormalities.  CT angiogram was within normal limits.  Patient was having issues with diarrhea and to be due to amiodarone and therefore this medication was stopped.  Patient seen by cardiology.  They then resume the amiodarone 200 mg along with a restart metoprolol 12.5 mg daily as well as a low-dose lisinopril 2.5 mg daily.  Patient is discharged at this time.    Patient is taking the above medications but stopped lisinopril as his blood pressure was running too low.     Patient says that he has coughed up flecks of blood intermittently. He says that     Other medical issues, see below   He still has R hand pain. He saw a rheumatologist and 2 orthopedic surgeons since last visit. He is currently going through hand therapy (doing essentially effleurage) and he states that his symptoms are improving significantly with therapy. He has no major complaints at this time.          RHEUMATOID ARTHRITIS: he now tells me that he had RA in the past and saw dr thompson . he does have ESR and CRP elevations in february 2022. he has a history of bleeding last november 2021 and he has been taking omeprazole ever since. he did not respond to Plaquenil at this time as his left fingers are swollen . he has not been taking Plaquenil for months.  Patient is tolerating Celebrex      rectal cancer: Oncology has signed off and he no longer sees Dr Carney. Last colonoscopy was last year.      bph: patient following with dr solomon. patient taking finasteride.      HLD: Patient denies any muscle aches and is tolerating statin therapy     GERD (Hx of GIB) : Patient denies any acid reflux symptoms including epigastric burning, nausea or vomiting. Denies any dysphagia.     family history: dad passed brain aneurysm, mom passed s/p heart surgery     colonoscopy is dr li 11/18/2021 with 5 year follow u    Visit Vitals  Smoking Status Never     PHYSICAL EXAM     General appearance:  Alert and in no acute distress. speech is clear and coherent  HEENT: Sclera and conjunctiva white, EOMI,     Respiratory : No respiratory distress, normal respiratory rhythm and effort. Clear bilateral breath sounds. No wheezing or rhonchi.   Cardiovascular: heart rate regular, S1, S2. no murmurs. no Lower extremity edema   MSK: 5/5 strength upper and lower extremities without gait abnormalities. no loss of muscle mass   Neuro: 2-12 CN grossly intact.  no slurred speech. no lateralizing deficit  Psychiatric Orientation: Oriented to person, place, and time. no depression, homicidal or suicidal thoughts, normal affect     REVIEW OF SYSTEMS   Constitutional: not feeling tired and no fever, chills or sweats. Denies weight loss    HEENT: no earache and no sore throat.   no headache  Cardiovascular: no exertional chest pain, no palpitations,    Lungs: Denies shortness of breath, exertional dyspnea   Gastrointestinal: no change in bowel habits, no diarrhea, no nausea, no vomiting and no abdominal pain. Denies Melena, brbpr or dark stool  Musculoskeletal: no myalgias, no muscle weakness     Neurological: no headaches, no seizures, no numbness, no lateralizing deficits and no fainting.   Psychiatric: no depression and no anxiety.      Allergies   Allergen Reactions    Neomycin Nausea/vomiting    Oxycodone-Acetaminophen Hallucinations     HALLUCINATIONS       Current Outpatient Medications   Medication Sig Dispense Refill    amiodarone (Pacerone) 200 mg tablet Take 1 tablet (200 mg) by mouth once daily.      celecoxib (CeleBREX) 200 mg capsule Take 1 capsule (200 mg) by mouth 2 times a day. 180 capsule 3    cholecalciferol (Vitamin D-3) 25 MCG (1000 UT) tablet Take 1 tablet (25 mcg) by mouth once daily.      colchicine (Mitigare) 0.6 mg capsule capsule Take 1 capsule (0.6 mg) by mouth once daily.      ferrous sulfate 325 (65 Fe) MG EC tablet Take 1 tablet by mouth 2 times a day with meals.      finasteride (Proscar) 5 mg  tablet Take 1 tablet (5 mg) by mouth once daily. 90 tablet 3    omeprazole (PriLOSEC) 40 mg DR capsule Take 1 capsule (40 mg) by mouth once daily. 90 capsule 3    Plavix 75 mg tablet Take 1 tablet (75 mg) by mouth once daily.      simvastatin (Zocor) 40 mg tablet Take 1 tablet (40 mg) by mouth once daily at bedtime. 90 tablet 3     No current facility-administered medications for this visit.       Objective     Lab Requisition on 03/29/2024   Component Date Value Ref Range Status    Case Report 03/29/2024    Final                    Value:Medical Cytology Report                           Case: [id number]                                  Authorizing Provider:  Adalberto Quigley, Collected:           03/29/2024 1200                                     MD                                                                           Ordering Location:     [facility]       Received:            04/01/2024 [facility]                                     Center                                                                       Specimen:    PLEURAL FLUID LEFT SIDE                                                                    Gross Description 03/29/2024    Final                    Value:This result contains rich text formatting which cannot be displayed here.    Specimen Processing Detail 03/29/2024    Final                    Value:This result contains rich text formatting which cannot be displayed here.       Radiology: Reviewed imaging in powerchart.  No results found.    No family history on file.  Social History     Socioeconomic History    Marital status:      Spouse name: Not on file    Number of children: Not on file    Years of education: Not on file    Highest education level: Not on file   Occupational History    Not on file   Tobacco Use    Smoking status: Never    Smokeless tobacco: Never   Substance and Sexual Activity    Alcohol use: Never    Drug use: Never    Sexual activity: Not on  file   Other Topics Concern    Not on file   Social History Narrative    Not on file     Social Determinants of Health     Financial Resource Strain: Not on file   Food Insecurity: Not on file   Transportation Needs: Not on file   Physical Activity: Not on file   Stress: Not on file   Social Connections: Not on file   Intimate Partner Violence: Not on file   Housing Stability: Not on file     Past Medical History:   Diagnosis Date    Other specified diseases of anus and rectum 04/04/2016    Rectal mass     Past Surgical History:   Procedure Laterality Date    OTHER SURGICAL HISTORY  03/16/2015    Lipectomy    OTHER SURGICAL HISTORY  10/16/2015    Laparo Partial Colectomy W/ Anastomosis And Coloproctostomy    SHOULDER SURGERY  03/16/2015    Shoulder Surgery       Charting was completed using voice recognition technology and may include unintended errors.

## 2024-08-30 ENCOUNTER — APPOINTMENT (OUTPATIENT)
Dept: PRIMARY CARE | Facility: CLINIC | Age: 79
End: 2024-08-30
Payer: MEDICARE